# Patient Record
Sex: FEMALE | Race: WHITE | Employment: FULL TIME | URBAN - NONMETROPOLITAN AREA
[De-identification: names, ages, dates, MRNs, and addresses within clinical notes are randomized per-mention and may not be internally consistent; named-entity substitution may affect disease eponyms.]

---

## 2018-12-20 RX ORDER — ESOMEPRAZOLE MAGNESIUM 40 MG/1
40 FOR SUSPENSION ORAL DAILY
COMMUNITY

## 2018-12-20 RX ORDER — ANASTROZOLE 1 MG/1
1 TABLET ORAL DAILY
COMMUNITY
End: 2019-01-15

## 2019-01-24 ENCOUNTER — INITIAL CONSULT (OUTPATIENT)
Dept: PULMONOLOGY | Age: 59
End: 2019-01-24
Payer: COMMERCIAL

## 2019-01-24 ENCOUNTER — TELEPHONE (OUTPATIENT)
Dept: PULMONOLOGY | Age: 59
End: 2019-01-24

## 2019-01-24 VITALS
BODY MASS INDEX: 22.47 KG/M2 | HEART RATE: 92 BPM | SYSTOLIC BLOOD PRESSURE: 132 MMHG | DIASTOLIC BLOOD PRESSURE: 86 MMHG | WEIGHT: 131.6 LBS | OXYGEN SATURATION: 96 % | RESPIRATION RATE: 20 BRPM | HEIGHT: 64 IN

## 2019-01-24 DIAGNOSIS — G47.10 HYPERSOMNIA: Primary | ICD-10-CM

## 2019-01-24 PROCEDURE — 99204 OFFICE O/P NEW MOD 45 MIN: CPT | Performed by: INTERNAL MEDICINE

## 2019-01-24 RX ORDER — ANASTROZOLE 1 MG/1
1 TABLET ORAL DAILY
COMMUNITY

## 2020-09-09 NOTE — PROGRESS NOTES
choking and gasping sensation at night time: No.  History of headaches in the morning:Yes. History of dry mouth in the morning: No.  History of palpitations during night time/nocturnal awakenings: Yes. History of sweating during night time/nocturnal awakenings: No She is going through menopause. General:  History of head injury in the past: No.    History of seizures: NO.  Rest less legs syndrome symptoms:NO  History suggestive of periodic limb movements during sleep: NO  History suggestive of hypnagogic hallucinations: NO  History suggestive of hypnopompic hallucinations: NO  History suggestive of sleep talking: NO  History suggestive of sleep walking:NO  History suggestive of bruxism: NO     History suggestive of cataplexy: NO  History suggestive of sleep paralysis:NO    Family history of sleep disorders:  Family history of obstructive sleep apnea: NO.  Family history of Narcolepsy: NO.  Family history of Rest less legs syndrome : NO.    History regarding old sleep studies:  Prior history of sleep study: Yes. Please see the diagnostic data section for details. Using CPAP device: No.  Currently using home Oxygen: NO.      Patient considerations:  Is the patient is ambulatory: Yes  Patient is currently using: None of these Wheelchair, Tanya Moellers or Napolean Dasen. Para/Quadriplegic: NO  Hearing deficit : NO  Claustrophobic: NO  MDD : NO  Blind: NO  Incontinent: NO  Para/Quadraplegi: NO.   Need transportation to and from Sleep Center:NO    Social History:  Social History     Tobacco Use    Smoking status: Never Smoker    Smokeless tobacco: Never Used   Substance Use Topics    Alcohol use: No    Drug use: No   .  She is currently working: Yes. She is currently working at [employer] during daytime.   She usually goes to her work at:  6:00AM.   She completes her work at:  11:30AM. Some times she stay until 4:30PM.                        Past Medical History:   Diagnosis Date    A-fib ([facility]     Arthralgia      esophagus     Bilateral leg paresthesia     Breast cancer (HCC)     Left    Bursitis, trochanteric     Carpal tunnel syndrome     Confusion, hx of, without neuro findings     Coronary artery spasm (HCC)     Degeneration of cervical intervertebral disc     Dyspareunia, female     Elevated liver function tests     Excessive sleepiness     Fatty liver     Headache     Hyperlipemia     Hypoglycemia     Hypokalemia     Hypomagnesemia     Leukocytosis     Microcytic anemia     Myocardial infarct, old     Non-ST Elevation NSTEMI    Neck pain     Neoplasm of unspecified behavior of bone, soft tissue, and skin     Normocytic anemia     Numbness and tingling in right arm/hand     Palpitations     Peripheral neuropathy     Reflux gastritis     Tachycardia     Tiredness        Past Surgical History:   Procedure Laterality Date    CARDIAC CATHETERIZATION  2012    CHOLECYSTECTOMY  11/18/2016    COLONOSCOPY  2009,2011,2017    COLONOSCOPY  1247,2076    EGD  [id number]    GASTRIC FUNDOPLICATION  9603    ILEOSTOMY OR JEJUNOSTOMY  02/27/2013    Gastrojejunostomy & repair of Diaphragmatic Hernia    MASTECTOMY, BILATERAL  06/08/2015    MASTECTOMY, BILATERAL  06/08/2015    PARTIAL HYSTERECTOMY      SALPINGO-OOPHORECTOMY Left 08/01/2016    TUBAL LIGATION  1988       Allergies   Allergen Reactions    Latex     Codeine      Rash       Current Outpatient Medications   Medication Sig Dispense Refill    MULTIPLE VITAMIN PO Take by mouth      b complex vitamins capsule Take 1 capsule by mouth daily      anastrozole (ARIMIDEX) 1 MG tablet Take 1 mg by mouth daily      vitamin D (CHOLECALCIFEROL) 1000 UNIT TABS tablet Take 1,000 Units by mouth daily      esomeprazole Magnesium (NEXIUM) 40 MG PACK Take 40 mg by mouth daily       No current facility-administered medications for this visit.         Family History   Problem Relation Age of Onset    Diabetes Mother     Breast Cancer Mother     Ovarian Cancer Mother     Coronary Art Dis Mother     Emphysema Father     Hypertension Father     Cancer Brother     Colon Cancer Brother 30        Review of Systems:    General/Constitutional: No recent loss of weight or appetite changes. No fever or chills. HENT: Negative. Eyes: Negative. Upper respiratory tract: No nasal stuffiness or post nasal drip. Lower respiratory tract/ lungs: No cough or sputum production. No hemoptysis. Cardiovascular: No palpitations or chest pain. Gastrointestinal: No nausea or vomiting. Neurological: No focal neurologiacal weakness. Extremities: No edema. Musculoskeletal: No complaints. Genitourinary: No complaints. Hematological: Negative. Psychiatric/Behavioral: Negative. Skin: No itching. /68 (Site: Right Upper Arm, Position: Sitting, Cuff Size: Medium Adult)   Pulse 76   Ht 5' 4\" (1.626 m)   Wt 152 lb (68.9 kg)   SpO2 97% Comment: on room air at rest  BMI 26.09 kg/m²   Mallampati airway Class:IV  Neck Circumference: 13 Inches  [place] sleepiness score 9/24/20: 23  SAQLI:68    Physical Exam   Nursing note and vitals reviewed. Constitutional: Patient appears moderately built and moderately nourished. No distress. Patient is oriented to person, place, and time. HENT:   Head: Normocephalic and atraumatic. Right Ear: External ear normal.   Left Ear: External ear normal.   Mouth/Throat: Oropharynx is clear and moist.  No oral thrush. Eyes: Conjunctivae are normal. Pupils are equal, round, and reactive to light. No scleral icterus. Neck: Neck supple. No JVD present. No tracheal deviation present. Cardiovascular: Normal rate, regular rhythm, normal heart sounds. No murmur heard. Pulmonary/Chest: Effort normal and breath sounds normal. No stridor. No respiratory distress. No wheezes. No rales. Patient exhibits no tenderness. Abdominal: Soft. Patient exhibits no distension. No tenderness.    Musculoskeletal: Normal range of motion. Extremities: Patient exhibits no edema and no tenderness. Lymphadenopathy:  No cervical adenopathy. Neurological: Patient is alert and oriented to person, place, and time. Skin: Skin is warm and dry. Patient is not diaphoretic. Psychiatric: Patient  has a normal mood and affect. Patient behavior is normal.     Diagnostic Data:    Sleep test:  No old sleep study reports were available. Old sleep study was performed > 10years back at [facility]. She was told at that time that she don't have sleep apnea. She also lost ~60lbs of weight from her old sleep study done > 10years back. Assessment:  -Excessive daytime sleepiness to evaluate for obstructive sleep apnea. -Inadequate sleep hygiene Vs Narcolepsy. -Breast cancer on left diagnosed 5years back. She underwent bilateral mastectomy.  -Hx of Atrial fibrillation. Diagnosed 4years back. She was taken off anticoagulation. She follows with a cardiologist at [facility].  -Hypersomnia ( Excessive daytime sleepiness) may be due to obstructive sleep apnea- less likely Vs Narcolepsy Vs Idiopathic hypersomnia. Recommendations/Plan:  - Schedule patient for nocturnal polysomnogram (Sleep study) followed by Multiple sleep latency test at [facility] sleep lab to further evaluate for Narcolepsy as an etiology for hypersomnia. Patient to follow with my [facility] sleep clinic in 1week after the sleep study.  -I had a discussion with patient regarding avialable treatment options for her sleep disorder breathing including but not limited to CPAP titration in the sleep lab Vs.Dental appliance placement with referral to a local dentist Vs other available surgical options including Uvulopalatopharyngoplasty, maxillomandibular ostomy and tracheostomy as last option.  At the end of discussion, she is not decided on her   treatment if she found to have obstructive sleep apnea at this time.  -We will see Nasir Crandall back in 1week after the sleep study to go over the sleep study results and further management options.  -She was educated to practice good sleep hygiene practices. She  was provided with a good sleep hygiene hand out.  -Riri Calloway was advised to make earlier appointment with my clinic if she develops any worsening of sleep symptoms. She verbalizes understanding.  -Riri Calloway was advised to not to drive any motor vehicles or operate heavy equipment until her sleep symptoms are under good control. [facility] understanding.  -She was advised to loose weight by controlling diet and doing exercise. - Kelvin Reyes was educated about my impression and plan. She verbalizes understanding.

## 2020-09-24 ENCOUNTER — INITIAL CONSULT (OUTPATIENT)
Dept: PULMONOLOGY | Age: 60
End: 2020-09-24
Payer: COMMERCIAL

## 2020-09-24 VITALS
HEART RATE: 76 BPM | OXYGEN SATURATION: 97 % | HEIGHT: 64 IN | DIASTOLIC BLOOD PRESSURE: 68 MMHG | WEIGHT: 152 LBS | BODY MASS INDEX: 25.95 KG/M2 | SYSTOLIC BLOOD PRESSURE: 114 MMHG

## 2020-09-24 PROCEDURE — 99204 OFFICE O/P NEW MOD 45 MIN: CPT | Performed by: INTERNAL MEDICINE

## 2020-09-24 RX ORDER — VITAMIN B COMPLEX
1 CAPSULE ORAL DAILY
COMMUNITY

## 2020-09-24 NOTE — PATIENT INSTRUCTIONS
Recommendations/Plan:  - Schedule patient for nocturnal polysomnogram (Sleep study) followed by Multiple sleep latency test at [facility] sleep lab to further evaluate for Narcolepsy as an etiology for hypersomnia. Patient to follow with my [address] [facility] sleep clinic in 1week after the sleep study.  -I had a discussion with patient regarding avialable treatment options for her sleep disorder breathing including but not limited to CPAP titration in the sleep lab Vs.Dental appliance placement with referral to a local dentist Vs other available surgical options including Uvulopalatopharyngoplasty, maxillomandibular ostomy and tracheostomy as last option. At the end of discussion, she is not decided on her   treatment if she found to have obstructive sleep apnea at this time.  -We will see Jesus Lopez back in 1week after the sleep study to go over the sleep study results and further management options.  -She was educated to practice good sleep hygiene practices. She  was provided with a good sleep hygiene hand out.  -Shirley Yeboah was advised to make earlier appointment with my clinic if she develops any worsening of sleep symptoms. She verbalizes understanding.  -Shirley Yeboah was advised to not to drive any motor vehicles or operate heavy equipment until her sleep symptoms are under good control. [address] understanding.  -She was advised to loose weight by controlling diet and doing exercise. - Jesus Lopez was educated about my impression and plan. She verbalizes understanding.

## 2021-07-21 ENCOUNTER — TELEPHONE (OUTPATIENT)
Dept: PULMONOLOGY | Age: 61
End: 2021-07-21

## 2021-07-21 NOTE — TELEPHONE ENCOUNTER
Patient called regarding appt. She thought she had missed an appt, but could not remember the date. Patient had an appt on 09/24/2020 with erma at the [place] [address] office and was ordered a sleep study. Patient has not had sleep study and was given the # for the sleep center at [facility] to call and schedule. Patient was instructed to contact the office to schedule her f/u visit for after her sleep study per OV notes. Patient voiced understanding.   Pt call back # [phone]